# Patient Record
Sex: FEMALE | Race: WHITE | NOT HISPANIC OR LATINO | Employment: UNEMPLOYED | URBAN - METROPOLITAN AREA
[De-identification: names, ages, dates, MRNs, and addresses within clinical notes are randomized per-mention and may not be internally consistent; named-entity substitution may affect disease eponyms.]

---

## 2023-12-12 ENCOUNTER — OFFICE VISIT (OUTPATIENT)
Dept: PRIMARY CARE | Facility: CLINIC | Age: 38
End: 2023-12-12
Payer: COMMERCIAL

## 2023-12-12 VITALS
HEIGHT: 68 IN | HEART RATE: 96 BPM | TEMPERATURE: 97.6 F | WEIGHT: 166 LBS | BODY MASS INDEX: 25.16 KG/M2 | DIASTOLIC BLOOD PRESSURE: 87 MMHG | SYSTOLIC BLOOD PRESSURE: 129 MMHG | OXYGEN SATURATION: 98 %

## 2023-12-12 DIAGNOSIS — G43.E09 CHRONIC MIGRAINE WITH AURA WITHOUT STATUS MIGRAINOSUS, NOT INTRACTABLE: ICD-10-CM

## 2023-12-12 DIAGNOSIS — L85.3 DRY SKIN DERMATITIS: ICD-10-CM

## 2023-12-12 DIAGNOSIS — Z76.89 ENCOUNTER TO ESTABLISH CARE: Primary | ICD-10-CM
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
## 2023-12-12 DIAGNOSIS — S30.861A TICK BITE OF ABDOMINAL WALL, INITIAL ENCOUNTER: ICD-10-CM

## 2023-12-12 PROBLEM — R30.0 DYSURIA: Status: ACTIVE | Noted: 2023-12-12

## 2023-12-12 PROBLEM — R87.610 ASCUS OF CERVIX WITH NEGATIVE HIGH RISK HPV: Status: ACTIVE | Noted: 2023-12-12

## 2023-12-12 PROBLEM — G54.0 THORACIC OUTLET SYNDROME: Status: ACTIVE | Noted: 2023-12-12

## 2023-12-12 PROBLEM — R82.81 PYURIA: Status: ACTIVE | Noted: 2023-12-12

## 2023-12-12 PROBLEM — O09.512 AMA (ADVANCED MATERNAL AGE) PRIMIGRAVIDA 35+, SECOND TRIMESTER (HHS-HCC): Status: ACTIVE | Noted: 2022-03-28

## 2023-12-12 PROBLEM — M99.01 CERVICAL SOMATIC DYSFUNCTION: Status: ACTIVE | Noted: 2023-12-12

## 2023-12-12 PROBLEM — R31.29 OTHER MICROSCOPIC HEMATURIA: Status: ACTIVE | Noted: 2023-12-12

## 2023-12-12 PROCEDURE — 99214 OFFICE O/P EST MOD 30 MIN: CPT | Performed by: STUDENT IN AN ORGANIZED HEALTH CARE EDUCATION/TRAINING PROGRAM

## 2023-12-12 PROCEDURE — 1036F TOBACCO NON-USER: CPT | Performed by: STUDENT IN AN ORGANIZED HEALTH CARE EDUCATION/TRAINING PROGRAM

## 2023-12-12 RX ORDER — SUMATRIPTAN 50 MG/1
50 TABLET, FILM COATED ORAL ONCE AS NEEDED
Qty: 27 TABLET | Refills: 0 | Status: SHIPPED | OUTPATIENT
Start: 2023-12-12 | End: 2024-12-11

## 2023-12-12 RX ORDER — DOXYCYCLINE 100 MG/1
100 CAPSULE ORAL 2 TIMES DAILY
Qty: 6 CAPSULE | Refills: 0 | Status: SHIPPED | OUTPATIENT
Start: 2023-12-12 | End: 2023-12-15

## 2023-12-12 RX ORDER — SUMATRIPTAN 50 MG/1
50 TABLET, FILM COATED ORAL ONCE AS NEEDED
Qty: 27 TABLET | Refills: 0 | Status: SHIPPED | OUTPATIENT
Start: 2023-12-12 | End: 2023-12-12 | Stop reason: SDUPTHER

## 2023-12-12 ASSESSMENT — ENCOUNTER SYMPTOMS
DIARRHEA: 0
RHINORRHEA: 0
COUGH: 0
VOMITING: 0
FATIGUE: 0
FEVER: 0
DEPRESSION: 0
CONSTIPATION: 0
OCCASIONAL FEELINGS OF UNSTEADINESS: 0
NAUSEA: 0
SHORTNESS OF BREATH: 0
ABDOMINAL PAIN: 0
HEADACHES: 1
CHILLS: 0
LOSS OF SENSATION IN FEET: 0

## 2023-12-12 ASSESSMENT — PATIENT HEALTH QUESTIONNAIRE - PHQ9
10. IF YOU CHECKED OFF ANY PROBLEMS, HOW DIFFICULT HAVE THESE PROBLEMS MADE IT FOR YOU TO DO YOUR WORK, TAKE CARE OF THINGS AT HOME, OR GET ALONG WITH OTHER PEOPLE: NOT DIFFICULT AT ALL
SUM OF ALL RESPONSES TO PHQ9 QUESTIONS 1 AND 2: 0
2. FEELING DOWN, DEPRESSED OR HOPELESS: NOT AT ALL
1. LITTLE INTEREST OR PLEASURE IN DOING THINGS: NOT AT ALL

## 2023-12-12 ASSESSMENT — COLUMBIA-SUICIDE SEVERITY RATING SCALE - C-SSRS
2. HAVE YOU ACTUALLY HAD ANY THOUGHTS OF KILLING YOURSELF?: NO
1. IN THE PAST MONTH, HAVE YOU WISHED YOU WERE DEAD OR WISHED YOU COULD GO TO SLEEP AND NOT WAKE UP?: NO
6. HAVE YOU EVER DONE ANYTHING, STARTED TO DO ANYTHING, OR PREPARED TO DO ANYTHING TO END YOUR LIFE?: NO

## 2023-12-12 NOTE — PROGRESS NOTES
"Subjective   Patient ID: Sherin Paniagua is a 38 y.o. female who presents for Establish Care (Tick bite 2 days ago ; migraines ; psoriasis).    HPI     She was previously following with a physician in the office.  She has been following with her Ob/Gyn for her pregnancies.    She had a tick bite. She noticed the tick on 12/9/23 and attached 12/11/23. No current symptoms.  She did not notice it initially.    She has been getting more of her migraines in the past.  She does get auras visually with blinking lights.  She has not     She has a history of psoriasis on her scalp.  She follows with the dermatologist.  She has a medication for a steroid.    Review of Systems   Constitutional:  Negative for chills, fatigue and fever.   HENT:  Negative for congestion and rhinorrhea.    Respiratory:  Negative for cough and shortness of breath.    Cardiovascular:  Negative for chest pain.   Gastrointestinal:  Negative for abdominal pain, constipation, diarrhea, nausea and vomiting.   Skin:  Negative for rash.        Dry skin over bilateral knees   Neurological:  Positive for headaches (intermittent).       Objective   /87   Pulse 96   Temp 36.4 °C (97.6 °F)   Ht 1.727 m (5' 8\")   Wt 75.3 kg (166 lb)   LMP 11/17/2023 (Exact Date)   SpO2 98%   BMI 25.24 kg/m²     Physical Exam  Vitals and nursing note reviewed.   Constitutional:       General: She is not in acute distress.     Appearance: Normal appearance. She is normal weight. She is not ill-appearing or toxic-appearing.   Cardiovascular:      Rate and Rhythm: Normal rate and regular rhythm.      Heart sounds: Normal heart sounds.   Pulmonary:      Effort: Pulmonary effort is normal.      Breath sounds: Normal breath sounds.   Skin:     General: Skin is warm and dry.      Comments: Tick bite left abdomen.    Dry skin both knees   Neurological:      Mental Status: She is alert.         Assessment/Plan   Problem List Items Addressed This Visit             ICD-10-CM    " Chronic migraine with aura without status migrainosus, not intractable G43.E09    Relevant Medications    SUMAtriptan (Imitrex) 50 mg tablet     Other Visit Diagnoses         Codes    Encounter to establish care    -  Primary Z76.89    Relevant Orders    CBC and Auto Differential    Comprehensive Metabolic Panel    Lipid Panel    Vitamin D 25-Hydroxy,Total (for eval of Vitamin D levels)    Urinalysis with Reflex Microscopic    TSH with reflex to Free T4 if abnormal    Tick bite of abdominal wall, initial encounter     S30.861A, W57.XXXA    Relevant Medications    doxycycline (Vibramycin) 100 mg capsule    Dry skin dermatitis     L85.3          History and physical examination as above.  Patient started on doxycycline for the next couple of days due to the tick bite.  Patient will call if she starts getting any symptoms especially a rash.  Refilled patient's medication for sumatriptan for her migraines.   Discussed patient's dry skin and recommended different moisturizers to starting with.  Blood work order sent to the patient to have done fasting.  Plan for patient to follow-up sometime in the next month for a wellness exam.  Patient will come in sooner for acute concerns or complaints.

## 2023-12-12 NOTE — PATIENT INSTRUCTIONS
Thank you for coming in and getting an established appointment with me.    I went ahead and sent in a medication called doxycycline that you will take twice a day for the next 3 days for the tick bite.  Please call if you start having any other concerns or symptoms.  Initial symptoms of Lyme in many cases is a rash with a typical bull's-eye-like appearance but not necessarily.  This can be over the area of the tick bite or at another area on the body.      I went ahead and sent in a prescription to trial for sumatriptan for your migraines.  Please take as directed only as needed for the migraine headaches.    Please start with moisturizing lotion for the very dry skin on the knees.  This does not look like it is typical psoriasis.  You can start with a good moisturizing lotion as we discussed in the office.    I did order lab work for you.  Please get the labs done before the end of the year.  Please be fasting for about 10 hours before getting it performed.  Lab is located downstairs on the first floor.  Lab is open on weekdays from about 6:30 AM to about 4:30 PM.  We will plan for a follow-up for a wellness visit sometime in January.  You can get set up for an appointment before you leave here today.    Please call with any additional questions or concerns.    Thank you

## 2023-12-20 ENCOUNTER — LAB (OUTPATIENT)
Dept: LAB | Facility: LAB | Age: 38
End: 2023-12-20
Payer: COMMERCIAL

## 2023-12-20 DIAGNOSIS — Z76.89 ENCOUNTER TO ESTABLISH CARE: Primary | ICD-10-CM

## 2023-12-20 DIAGNOSIS — R79.89 ABNORMAL TSH: ICD-10-CM

## 2023-12-20 LAB
BASOPHILS # BLD AUTO: 0.04 X10*3/UL (ref 0–0.1)
BASOPHILS NFR BLD AUTO: 0.7 %
BURR CELLS BLD QL SMEAR: NORMAL
EOSINOPHIL # BLD AUTO: 0.05 X10*3/UL (ref 0–0.7)
EOSINOPHIL NFR BLD AUTO: 0.9 %
ERYTHROCYTE [DISTWIDTH] IN BLOOD BY AUTOMATED COUNT: 13.5 % (ref 11.5–14.5)
HCT VFR BLD AUTO: 41.3 % (ref 36–46)
HGB BLD-MCNC: 13.4 G/DL (ref 12–16)
IMM GRANULOCYTES # BLD AUTO: 0.01 X10*3/UL (ref 0–0.7)
IMM GRANULOCYTES NFR BLD AUTO: 0.2 % (ref 0–0.9)
LYMPHOCYTES # BLD AUTO: 2 X10*3/UL (ref 1.2–4.8)
LYMPHOCYTES NFR BLD AUTO: 34.8 %
MCH RBC QN AUTO: 27.6 PG (ref 26–34)
MCHC RBC AUTO-ENTMCNC: 32.4 G/DL (ref 32–36)
MCV RBC AUTO: 85 FL (ref 80–100)
MONOCYTES # BLD AUTO: 0.27 X10*3/UL (ref 0.1–1)
MONOCYTES NFR BLD AUTO: 4.7 %
NEUTROPHILS # BLD AUTO: 3.37 X10*3/UL (ref 1.2–7.7)
NEUTROPHILS NFR BLD AUTO: 58.7 %
NRBC BLD-RTO: 0 /100 WBCS (ref 0–0)
PLATELET # BLD AUTO: NORMAL 10*3/UL
RBC # BLD AUTO: 4.85 X10*6/UL (ref 4–5.2)
RBC MORPH BLD: NORMAL
WBC # BLD AUTO: 5.7 X10*3/UL (ref 4.4–11.3)

## 2023-12-20 PROCEDURE — 80061 LIPID PANEL: CPT

## 2023-12-20 PROCEDURE — 84439 ASSAY OF FREE THYROXINE: CPT

## 2023-12-20 PROCEDURE — 85025 COMPLETE CBC W/AUTO DIFF WBC: CPT

## 2023-12-20 PROCEDURE — 36415 COLL VENOUS BLD VENIPUNCTURE: CPT

## 2023-12-20 PROCEDURE — 82306 VITAMIN D 25 HYDROXY: CPT

## 2023-12-20 PROCEDURE — 81001 URINALYSIS AUTO W/SCOPE: CPT

## 2023-12-20 PROCEDURE — 80053 COMPREHEN METABOLIC PANEL: CPT

## 2023-12-20 PROCEDURE — 84443 ASSAY THYROID STIM HORMONE: CPT

## 2023-12-21 LAB
25(OH)D3 SERPL-MCNC: 18 NG/ML (ref 30–100)
ALBUMIN SERPL BCP-MCNC: 4.5 G/DL (ref 3.4–5)
ALP SERPL-CCNC: 40 U/L (ref 33–110)
ALT SERPL W P-5'-P-CCNC: 12 U/L (ref 7–45)
ANION GAP SERPL CALC-SCNC: 15 MMOL/L (ref 10–20)
APPEARANCE UR: ABNORMAL
AST SERPL W P-5'-P-CCNC: 16 U/L (ref 9–39)
BILIRUB SERPL-MCNC: 0.6 MG/DL (ref 0–1.2)
BILIRUB UR STRIP.AUTO-MCNC: NEGATIVE MG/DL
BUN SERPL-MCNC: 17 MG/DL (ref 6–23)
CALCIUM SERPL-MCNC: 9.4 MG/DL (ref 8.6–10.6)
CHLORIDE SERPL-SCNC: 107 MMOL/L (ref 98–107)
CHOLEST SERPL-MCNC: 191 MG/DL (ref 0–199)
CHOLESTEROL/HDL RATIO: 3.4
CO2 SERPL-SCNC: 24 MMOL/L (ref 21–32)
COLOR UR: ABNORMAL
CREAT SERPL-MCNC: 0.69 MG/DL (ref 0.5–1.05)
GFR SERPL CREATININE-BSD FRML MDRD: >90 ML/MIN/1.73M*2
GLUCOSE SERPL-MCNC: 87 MG/DL (ref 74–99)
GLUCOSE UR STRIP.AUTO-MCNC: NEGATIVE MG/DL
HDLC SERPL-MCNC: 56.7 MG/DL
KETONES UR STRIP.AUTO-MCNC: NEGATIVE MG/DL
LDLC SERPL CALC-MCNC: 110 MG/DL
LEUKOCYTE ESTERASE UR QL STRIP.AUTO: ABNORMAL
MUCOUS THREADS #/AREA URNS AUTO: ABNORMAL /LPF
NITRITE UR QL STRIP.AUTO: NEGATIVE
NON HDL CHOLESTEROL: 134 MG/DL (ref 0–149)
PH UR STRIP.AUTO: 5 [PH]
POTASSIUM SERPL-SCNC: 4.5 MMOL/L (ref 3.5–5.3)
PROT SERPL-MCNC: 7.6 G/DL (ref 6.4–8.2)
PROT UR STRIP.AUTO-MCNC: NEGATIVE MG/DL
RBC # UR STRIP.AUTO: ABNORMAL /UL
RBC #/AREA URNS AUTO: ABNORMAL /HPF
SODIUM SERPL-SCNC: 141 MMOL/L (ref 136–145)
SP GR UR STRIP.AUTO: 1.02
SQUAMOUS #/AREA URNS AUTO: ABNORMAL /HPF
T4 FREE SERPL-MCNC: 1.2 NG/DL (ref 0.78–1.48)
TRIGL SERPL-MCNC: 124 MG/DL (ref 0–149)
TSH SERPL-ACNC: 0.21 MIU/L (ref 0.44–3.98)
UROBILINOGEN UR STRIP.AUTO-MCNC: <2 MG/DL
VLDL: 25 MG/DL (ref 0–40)
WBC #/AREA URNS AUTO: ABNORMAL /HPF

## 2024-01-22 ENCOUNTER — LAB (OUTPATIENT)
Dept: LAB | Facility: LAB | Age: 39
End: 2024-01-22
Payer: COMMERCIAL

## 2024-01-22 DIAGNOSIS — R79.89 ABNORMAL TSH: ICD-10-CM

## 2024-01-22 LAB
T4 FREE SERPL-MCNC: 1.24 NG/DL (ref 0.78–1.48)
TSH SERPL-ACNC: 0.38 MIU/L (ref 0.44–3.98)

## 2024-01-22 PROCEDURE — 84443 ASSAY THYROID STIM HORMONE: CPT

## 2024-01-22 PROCEDURE — 36415 COLL VENOUS BLD VENIPUNCTURE: CPT

## 2024-01-22 PROCEDURE — 84439 ASSAY OF FREE THYROXINE: CPT

## 2024-01-23 ENCOUNTER — OFFICE VISIT (OUTPATIENT)
Dept: PRIMARY CARE | Facility: CLINIC | Age: 39
End: 2024-01-23
Payer: COMMERCIAL

## 2024-01-23 VITALS
HEART RATE: 84 BPM | HEIGHT: 68 IN | OXYGEN SATURATION: 97 % | DIASTOLIC BLOOD PRESSURE: 75 MMHG | BODY MASS INDEX: 25.01 KG/M2 | WEIGHT: 165 LBS | SYSTOLIC BLOOD PRESSURE: 105 MMHG | TEMPERATURE: 97.7 F

## 2024-01-23 DIAGNOSIS — Z00.00 ENCOUNTER FOR WELLNESS EXAMINATION IN ADULT: Primary | ICD-10-CM

## 2024-01-23 DIAGNOSIS — R87.610 ASCUS OF CERVIX WITH NEGATIVE HIGH RISK HPV: ICD-10-CM

## 2024-01-23 DIAGNOSIS — E55.9 VITAMIN D DEFICIENCY: ICD-10-CM

## 2024-01-23 DIAGNOSIS — G43.E09 CHRONIC MIGRAINE WITH AURA WITHOUT STATUS MIGRAINOSUS, NOT INTRACTABLE: ICD-10-CM

## 2024-01-23 PROCEDURE — 1036F TOBACCO NON-USER: CPT | Performed by: STUDENT IN AN ORGANIZED HEALTH CARE EDUCATION/TRAINING PROGRAM

## 2024-01-23 PROCEDURE — 99395 PREV VISIT EST AGE 18-39: CPT | Performed by: STUDENT IN AN ORGANIZED HEALTH CARE EDUCATION/TRAINING PROGRAM

## 2024-01-23 RX ORDER — ACETAMINOPHEN 500 MG
TABLET ORAL DAILY
COMMUNITY

## 2024-01-23 RX ORDER — ERGOCALCIFEROL 1.25 MG/1
50000 CAPSULE ORAL
Qty: 4 CAPSULE | Refills: 1 | Status: SHIPPED | OUTPATIENT
Start: 2024-01-23 | End: 2024-03-19

## 2024-01-23 ASSESSMENT — ENCOUNTER SYMPTOMS
PALPITATIONS: 0
DYSURIA: 0
NERVOUS/ANXIOUS: 0
NUMBNESS: 0
DYSPHORIC MOOD: 0
RHINORRHEA: 0
ABDOMINAL PAIN: 0
FEVER: 0
FATIGUE: 0
VOMITING: 0
FREQUENCY: 0
MYALGIAS: 0
DIARRHEA: 0
COLOR CHANGE: 0
ARTHRALGIAS: 0
SHORTNESS OF BREATH: 0
CHILLS: 0
NAUSEA: 0
DIZZINESS: 0
CONSTIPATION: 0
LIGHT-HEADEDNESS: 0
COUGH: 0

## 2024-01-23 ASSESSMENT — PATIENT HEALTH QUESTIONNAIRE - PHQ9
SUM OF ALL RESPONSES TO PHQ9 QUESTIONS 1 AND 2: 0
1. LITTLE INTEREST OR PLEASURE IN DOING THINGS: NOT AT ALL
2. FEELING DOWN, DEPRESSED OR HOPELESS: NOT AT ALL

## 2024-01-23 NOTE — PATIENT INSTRUCTIONS
Thank you for coming for your wellness examination today.    I went ahead and sent in a prescription for vitamin D.  You can take this once a week for the next 8 weeks.  I did order repeat vitamin D level to be checked after that time and we will contact you with those results.    We reviewed the results of your lab work which overall look pretty good.  We discussed healthy diet and exercise and dietary changes to help with some elevated cholesterol.  We can definitely check this again in 6 months or after if you would like.  Just call if you would need an order for the blood test.  Continue with plenty of fruits and vegetables, lean sources of protein, watching her carbohydrate intake and not drinking extra calories.  I do recommend trying to add an exercise to your routine.  This can start very light at first especially given your time commitment with children.  I do always recommend some combination of cardiovascular activity, stretching and some sort of strength training.  Always start off very light and then slowly increase as you are able to.  I always recommend starting off with basic as well.    Excellent job with keeping away from tobacco and limiting alcohol use.  Please continue with regular OB/GYN follow-up.    We will continue with yearly follow-up.  Please come in sooner for any other acute concerns or complaints.    Thank you

## 2024-01-23 NOTE — PROGRESS NOTES
"Subjective   Patient ID: Sherin Paniagua is a 38 y.o. female who presents for Annual Exam.    HPI     No acute concerns or complaints today.    Dental: Regular dental exams twice yearly.  Vision: Glasses and contacts. Regular yearly eye exams.    Diet: Vegetarian diet 4 days a week. Still lean protein.  Exercise: With her kids. On her feet and walking all day.  Caffeine: 1 cup of coffee  Fluids: Not very well hydrated.  Supplements: Vitamin D, Prenatal    Tobacco: None. Never.  Alcohol: Socially. 1 drink per week max.  Recreational Drugs: None.    Last Colonoscopy: No family history of colon cancer.  Last Pap smear: Regular Ob/Gyn care. Pap updated 01/2024.  Mammogram: No family history of breast cancer.  Low Dose Lung CT Screening: Not indicated  AAA Screening: Not indicated.    Influenza: Recommended booster.  Tetanus: 2020  COVID: Pfizer last updated 2022. Recommended 2501-0087 booster.    Review of Systems   Constitutional:  Negative for chills, fatigue and fever.   HENT:  Negative for congestion and rhinorrhea.    Eyes:  Negative for visual disturbance.   Respiratory:  Negative for cough and shortness of breath.    Cardiovascular:  Negative for chest pain and palpitations.   Gastrointestinal:  Negative for abdominal pain, constipation, diarrhea, nausea and vomiting.   Genitourinary:  Negative for dysuria and frequency.   Musculoskeletal:  Negative for arthralgias and myalgias.   Skin:  Negative for color change and rash.   Neurological:  Negative for dizziness, light-headedness and numbness.   Psychiatric/Behavioral:  Negative for dysphoric mood. The patient is not nervous/anxious.        Objective   /75   Pulse 84   Temp 36.5 °C (97.7 °F)   Ht 1.715 m (5' 7.5\")   Wt 74.8 kg (165 lb)   SpO2 97%   BMI 25.46 kg/m²   BSA Body surface area is 1.89 meters squared.      Physical Exam  Vitals and nursing note reviewed.   Constitutional:       General: She is not in acute distress.     Appearance: Normal " appearance. She is normal weight. She is not ill-appearing or toxic-appearing.   HENT:      Head: Normocephalic and atraumatic.      Right Ear: Tympanic membrane, ear canal and external ear normal.      Left Ear: Tympanic membrane, ear canal and external ear normal.      Nose: Nose normal.      Mouth/Throat:      Mouth: Mucous membranes are moist.   Eyes:      Extraocular Movements: Extraocular movements intact.      Conjunctiva/sclera: Conjunctivae normal.      Pupils: Pupils are equal, round, and reactive to light.   Cardiovascular:      Rate and Rhythm: Normal rate and regular rhythm.      Pulses: Normal pulses.      Heart sounds: Normal heart sounds.   Pulmonary:      Effort: Pulmonary effort is normal.      Breath sounds: Normal breath sounds.   Abdominal:      General: Abdomen is flat. Bowel sounds are normal.      Palpations: Abdomen is soft.   Musculoskeletal:         General: Normal range of motion.      Cervical back: Normal range of motion and neck supple.   Skin:     General: Skin is warm.   Neurological:      General: No focal deficit present.      Mental Status: She is alert and oriented to person, place, and time. Mental status is at baseline.      Cranial Nerves: No cranial nerve deficit.      Sensory: No sensory deficit.   Psychiatric:         Mood and Affect: Mood normal.         Behavior: Behavior normal.         Thought Content: Thought content normal.         Judgment: Judgment normal.       Lab on 01/22/2024   Component Date Value Ref Range Status    Thyroid Stimulating Hormone 01/22/2024 0.38 (L)  0.44 - 3.98 mIU/L Final    Thyroxine, Free 01/22/2024 1.24  0.78 - 1.48 ng/dL Final   Lab on 12/20/2023   Component Date Value Ref Range Status    WBC 12/20/2023 5.7  4.4 - 11.3 x10*3/uL Final    nRBC 12/20/2023 0.0  0.0 - 0.0 /100 WBCs Final    RBC 12/20/2023 4.85  4.00 - 5.20 x10*6/uL Final    Hemoglobin 12/20/2023 13.4  12.0 - 16.0 g/dL Final    Hematocrit 12/20/2023 41.3  36.0 - 46.0 % Final     MCV 12/20/2023 85  80 - 100 fL Final    MCH 12/20/2023 27.6  26.0 - 34.0 pg Final    MCHC 12/20/2023 32.4  32.0 - 36.0 g/dL Final    RDW 12/20/2023 13.5  11.5 - 14.5 % Final    Platelets 12/20/2023    Final    PLATELET CLUMPS PRECLUDE QUANTITATION. PLATELET ESTIMATE APPEARS ADEQUATE    Neutrophils % 12/20/2023 58.7  40.0 - 80.0 % Final    Immature Granulocytes %, Automated 12/20/2023 0.2  0.0 - 0.9 % Final    Immature Granulocyte Count (IG) includes promyelocytes, myelocytes and metamyelocytes but does not include bands. Percent differential counts (%) should be interpreted in the context of the absolute cell counts (cells/UL).    Lymphocytes % 12/20/2023 34.8  13.0 - 44.0 % Final    Monocytes % 12/20/2023 4.7  2.0 - 10.0 % Final    Eosinophils % 12/20/2023 0.9  0.0 - 6.0 % Final    Basophils % 12/20/2023 0.7  0.0 - 2.0 % Final    Neutrophils Absolute 12/20/2023 3.37  1.20 - 7.70 x10*3/uL Final    Percent differential counts (%) should be interpreted in the context of the absolute cell counts (cells/uL).    Immature Granulocytes Absolute, Au* 12/20/2023 0.01  0.00 - 0.70 x10*3/uL Final    Lymphocytes Absolute 12/20/2023 2.00  1.20 - 4.80 x10*3/uL Final    Monocytes Absolute 12/20/2023 0.27  0.10 - 1.00 x10*3/uL Final    Eosinophils Absolute 12/20/2023 0.05  0.00 - 0.70 x10*3/uL Final    Basophils Absolute 12/20/2023 0.04  0.00 - 0.10 x10*3/uL Final    Glucose 12/20/2023 87  74 - 99 mg/dL Final    Sodium 12/20/2023 141  136 - 145 mmol/L Final    Potassium 12/20/2023 4.5  3.5 - 5.3 mmol/L Final    Chloride 12/20/2023 107  98 - 107 mmol/L Final    Bicarbonate 12/20/2023 24  21 - 32 mmol/L Final    Anion Gap 12/20/2023 15  10 - 20 mmol/L Final    Urea Nitrogen 12/20/2023 17  6 - 23 mg/dL Final    Creatinine 12/20/2023 0.69  0.50 - 1.05 mg/dL Final    eGFR 12/20/2023 >90  >60 mL/min/1.73m*2 Final    Calculations of estimated GFR are performed using the 2021 CKD-EPI Study Refit equation without the race variable for the  IDMS-Traceable creatinine methods.  https://jasn.asnjournals.org/content/early/2021/09/22/ASN.3845742543    Calcium 12/20/2023 9.4  8.6 - 10.6 mg/dL Final    Albumin 12/20/2023 4.5  3.4 - 5.0 g/dL Final    Alkaline Phosphatase 12/20/2023 40  33 - 110 U/L Final    Total Protein 12/20/2023 7.6  6.4 - 8.2 g/dL Final    AST 12/20/2023 16  9 - 39 U/L Final    Bilirubin, Total 12/20/2023 0.6  0.0 - 1.2 mg/dL Final    ALT 12/20/2023 12  7 - 45 U/L Final    Patients treated with Sulfasalazine may generate falsely decreased results for ALT.    Cholesterol 12/20/2023 191  0 - 199 mg/dL Final          Age      Desirable   Borderline High   High     0-19 Y     0 - 169       170 - 199     >/= 200    20-24 Y     0 - 189       190 - 224     >/= 225         >24 Y     0 - 199       200 - 239     >/= 240   **All ranges are based on fasting samples. Specific   therapeutic targets will vary based on patient-specific   cardiac risk.    Pediatric guidelines reference:Pediatrics 2011, 128(S5).Adult guidelines reference: NCEP ATPIII Guidelines,RENATO 2001, 258:2486-97    Venipuncture immediately after or during the administration of Metamizole may lead to falsely low results. Testing should be performed immediately prior to Metamizole dosing.    HDL-Cholesterol 12/20/2023 56.7  mg/dL Final      Age       Very Low   Low     Normal    High    0-19 Y    < 35      < 40     40-45     ----  20-24 Y    ----     < 40      >45      ----        >24 Y      ----     < 40     40-60      >60      Cholesterol/HDL Ratio 12/20/2023 3.4   Final      Ref Values  Desirable  < 3.4  High Risk  > 5.0    LDL Calculated 12/20/2023 110 (H)  <=99 mg/dL Final                                Near   Borderline      AGE      Desirable  Optimal    High     High     Very High     0-19 Y     0 - 109     ---    110-129   >/= 130     ----    20-24 Y     0 - 119     ---    120-159   >/= 160     ----      >24 Y     0 -  99   100-129  130-159   160-189     >/=190      VLDL  12/20/2023 25  0 - 40 mg/dL Final    Triglycerides 12/20/2023 124  0 - 149 mg/dL Final       Age         Desirable   Borderline High   High     Very High   0 D-90 D    19 - 174         ----         ----        ----  91 D- 9 Y     0 -  74        75 -  99     >/= 100      ----    10-19 Y     0 -  89        90 - 129     >/= 130      ----    20-24 Y     0 - 114       115 - 149     >/= 150      ----         >24 Y     0 - 149       150 - 199    200- 499    >/= 500    Venipuncture immediately after or during the administration of Metamizole may lead to falsely low results. Testing should be performed immediately prior to Metamizole dosing.    Non HDL Cholesterol 12/20/2023 134  0 - 149 mg/dL Final          Age       Desirable   Borderline High   High     Very High     0-19 Y     0 - 119       120 - 144     >/= 145    >/= 160    20-24 Y     0 - 149       150 - 189     >/= 190      ----         >24 Y    30 mg/dL above LDL Cholesterol goal      Vitamin D, 25-Hydroxy, Total 12/20/2023 18 (L)  30 - 100 ng/mL Final    Color, Urine 12/20/2023 Maria Luz (N)  Straw, Yellow Final    Appearance, Urine 12/20/2023 Hazy (N)  Clear Final    Specific Gravity, Urine 12/20/2023 1.023  1.005 - 1.035 Final    pH, Urine 12/20/2023 5.0  5.0, 5.5, 6.0, 6.5, 7.0, 7.5, 8.0 Final    Protein, Urine 12/20/2023 NEGATIVE  NEGATIVE mg/dL Final    Glucose, Urine 12/20/2023 NEGATIVE  NEGATIVE mg/dL Final    Blood, Urine 12/20/2023 MODERATE (2+) (A)  NEGATIVE Final    Ketones, Urine 12/20/2023 NEGATIVE  NEGATIVE mg/dL Final    Bilirubin, Urine 12/20/2023 NEGATIVE  NEGATIVE Final    Urobilinogen, Urine 12/20/2023 <2.0  <2.0 mg/dL Final    Nitrite, Urine 12/20/2023 NEGATIVE  NEGATIVE Final    Leukocyte Esterase, Urine 12/20/2023 SMALL (1+) (A)  NEGATIVE Final    Thyroid Stimulating Hormone 12/20/2023 0.21 (L)  0.44 - 3.98 mIU/L Final    RBC Morphology 12/20/2023 See Below   Final    Arlene Cells 12/20/2023 Few   Final    WBC, Urine 12/20/2023 11-20 (A)  1-5, NONE  /HPF Final    RBC, Urine 12/20/2023 1-2  NONE, 1-2, 3-5 /HPF Final    Squamous Epithelial Cells, Urine 12/20/2023 10-25 (FEW)  Reference range not established. /HPF Final    Mucus, Urine 12/20/2023 1+  Reference range not established. /LPF Final    Thyroxine, Free 12/20/2023 1.20  0.78 - 1.48 ng/dL Final     Current Outpatient Medications on File Prior to Visit   Medication Sig Dispense Refill    cholecalciferol (Vitamin D3) 5,000 Units tablet Take by mouth once daily.      SUMAtriptan (Imitrex) 50 mg tablet Take 1 tablet (50 mg) by mouth 1 time if needed for migraine. May repeat after 2 hours. 27 tablet 0     No current facility-administered medications on file prior to visit.     No images are attached to the encounter.        Assessment/Plan   Problem List Items Addressed This Visit             ICD-10-CM    ASCUS of cervix with negative high risk HPV R87.610     History. Following with Ob/Gyn for regular Paps yearly.         Chronic migraine with aura without status migrainosus, not intractable G43.E09     Chronic. Sumatriptan as needed.         Vitamin D deficiency E55.9    Relevant Medications    ergocalciferol (Vitamin D-2) 1.25 MG (22056 UT) capsule    Other Relevant Orders    Vitamin D 25-Hydroxy,Total (for eval of Vitamin D levels)     Other Visit Diagnoses         Codes    Encounter for wellness examination in adult    -  Primary Z00.00          History and physical examination as above.  Patient presenting for annual wellness visit.  No acute concerns or complaints.  Discussed multiple aspects of health including healthy diet and exercise, regular dental and vision care, caffeine, fluid hydration and supplementations, substance use, screening tests and immunizations.  Patient follows regularly with her OB/GYN.  Started on vitamin D supplementation.  Patient can consider repeat testing with cholesterol in about 6 months after making dietary and lifestyle changes.  Patient will continue with yearly wellness  care.  She can come in sooner for other acute concerns or complaints.

## 2025-01-28 ENCOUNTER — APPOINTMENT (OUTPATIENT)
Dept: PRIMARY CARE | Facility: CLINIC | Age: 40
End: 2025-01-28
Payer: COMMERCIAL

## 2025-01-28 VITALS
HEIGHT: 68 IN | DIASTOLIC BLOOD PRESSURE: 82 MMHG | SYSTOLIC BLOOD PRESSURE: 116 MMHG | HEART RATE: 73 BPM | OXYGEN SATURATION: 97 % | WEIGHT: 184 LBS | BODY MASS INDEX: 27.89 KG/M2 | TEMPERATURE: 97 F

## 2025-01-28 DIAGNOSIS — E55.9 VITAMIN D DEFICIENCY: ICD-10-CM

## 2025-01-28 DIAGNOSIS — Z00.00 HEALTHCARE MAINTENANCE: ICD-10-CM

## 2025-01-28 DIAGNOSIS — Z00.00 ENCOUNTER FOR ADULT WELLNESS VISIT: Primary | ICD-10-CM

## 2025-01-28 PROCEDURE — 3008F BODY MASS INDEX DOCD: CPT | Performed by: STUDENT IN AN ORGANIZED HEALTH CARE EDUCATION/TRAINING PROGRAM

## 2025-01-28 PROCEDURE — 99395 PREV VISIT EST AGE 18-39: CPT | Performed by: STUDENT IN AN ORGANIZED HEALTH CARE EDUCATION/TRAINING PROGRAM

## 2025-01-28 ASSESSMENT — ENCOUNTER SYMPTOMS
COLOR CHANGE: 0
DIARRHEA: 0
CHILLS: 0
DYSPHORIC MOOD: 0
ARTHRALGIAS: 0
FATIGUE: 0
CONSTIPATION: 0
MYALGIAS: 0
COUGH: 0
NUMBNESS: 0
RHINORRHEA: 0
VOMITING: 0
FREQUENCY: 0
FEVER: 0
NAUSEA: 0
ABDOMINAL PAIN: 0
NERVOUS/ANXIOUS: 0
SHORTNESS OF BREATH: 0
DIZZINESS: 0
LIGHT-HEADEDNESS: 0
DYSURIA: 0
PALPITATIONS: 0

## 2025-01-28 ASSESSMENT — PATIENT HEALTH QUESTIONNAIRE - PHQ9
1. LITTLE INTEREST OR PLEASURE IN DOING THINGS: NOT AT ALL
2. FEELING DOWN, DEPRESSED OR HOPELESS: NOT AT ALL
SUM OF ALL RESPONSES TO PHQ9 QUESTIONS 1 AND 2: 0

## 2025-01-28 NOTE — PROGRESS NOTES
"Subjective   Patient ID: Sherin Paniagua is a 39 y.o. female who presents for Annual Exam.    HPI     No acute concerns or complaints today.    Dental: Regular dental exams twice yearly.  Vision: Glasses and contacts. Regular yearly eye exams.    Diet: Breast feeding and so she is thirsty. Not as good of a diet currently with her kids.   Exercise: With her kids. On her feet and walking all day.   Caffeine: 1 cup of coffee  Fluids: Working at hydration  Supplements: None currently    Tobacco: None. Never.  Alcohol: None while breastfeeding. Otherwise socially. 1 drink per week max.  Recreational Drugs: None.    Last Colonoscopy: No family history of colon cancer.   Last Pap smear: Following with Ob/Gyn and up to date on Paps.  Mammogram: No family history of breast cancer.  Low Dose Lung CT Screening: Not indicated  AAA Screening: Not indicated.    Influenza: Recommended annually.  Tetanus: 2024  COVID: Up to date for 5155-5973  RSV: Complete with pregnancy in 2024    Review of Systems   Constitutional:  Negative for chills, fatigue and fever.   HENT:  Negative for congestion and rhinorrhea.    Eyes:  Negative for visual disturbance.   Respiratory:  Negative for cough and shortness of breath.    Cardiovascular:  Negative for chest pain and palpitations.   Gastrointestinal:  Negative for abdominal pain, constipation, diarrhea, nausea and vomiting.   Genitourinary:  Negative for dysuria and frequency.   Musculoskeletal:  Negative for arthralgias and myalgias.   Skin:  Negative for color change and rash.   Neurological:  Negative for dizziness, light-headedness and numbness.   Psychiatric/Behavioral:  Negative for dysphoric mood. The patient is not nervous/anxious.        Objective   /82   Pulse 73   Temp 36.1 °C (97 °F)   Ht 1.715 m (5' 7.5\")   Wt 83.5 kg (184 lb)   SpO2 97%   BMI 28.39 kg/m²   BSA Body surface area is 1.99 meters squared.      Physical Exam  Vitals and nursing note reviewed. "   Constitutional:       General: She is not in acute distress.     Appearance: Normal appearance. She is normal weight. She is not ill-appearing or toxic-appearing.   HENT:      Head: Normocephalic and atraumatic.      Right Ear: Tympanic membrane, ear canal and external ear normal.      Left Ear: Tympanic membrane, ear canal and external ear normal.      Nose: Nose normal.      Mouth/Throat:      Mouth: Mucous membranes are moist.   Eyes:      Extraocular Movements: Extraocular movements intact.      Conjunctiva/sclera: Conjunctivae normal.      Pupils: Pupils are equal, round, and reactive to light.   Cardiovascular:      Rate and Rhythm: Normal rate and regular rhythm.      Pulses: Normal pulses.      Heart sounds: Normal heart sounds.   Pulmonary:      Effort: Pulmonary effort is normal.      Breath sounds: Normal breath sounds.   Abdominal:      General: Abdomen is flat. Bowel sounds are normal.      Palpations: Abdomen is soft.   Musculoskeletal:         General: Normal range of motion.      Cervical back: Normal range of motion and neck supple.   Skin:     General: Skin is warm.   Neurological:      General: No focal deficit present.      Mental Status: She is alert and oriented to person, place, and time. Mental status is at baseline.      Cranial Nerves: No cranial nerve deficit.      Sensory: No sensory deficit.   Psychiatric:         Mood and Affect: Mood normal.         Behavior: Behavior normal.         Thought Content: Thought content normal.         Judgment: Judgment normal.       No visits with results within 1 Year(s) from this visit.   Latest known visit with results is:   Lab on 01/22/2024   Component Date Value Ref Range Status    Thyroid Stimulating Hormone 01/22/2024 0.38 (L)  0.44 - 3.98 mIU/L Final    Thyroxine, Free 01/22/2024 1.24  0.78 - 1.48 ng/dL Final     Current Outpatient Medications on File Prior to Visit   Medication Sig Dispense Refill    PNV 22/iron,gluc/folic/dss/dha (PNV OB+DHA  ORAL) Take by mouth.      cholecalciferol (Vitamin D3) 5,000 Units tablet Take by mouth once daily. (Patient not taking: Reported on 1/28/2025)      SUMAtriptan (Imitrex) 50 mg tablet Take 1 tablet (50 mg) by mouth 1 time if needed for migraine. May repeat after 2 hours. 27 tablet 0     No current facility-administered medications on file prior to visit.     No images are attached to the encounter.        Assessment/Plan   Problem List Items Addressed This Visit             ICD-10-CM    Vitamin D deficiency E55.9     Other Visit Diagnoses         Codes    Encounter for adult wellness visit    -  Primary Z00.00    Healthcare maintenance     Z00.00    Relevant Orders    CBC and Auto Differential    Comprehensive Metabolic Panel    Lipid Panel    Vitamin D 25-Hydroxy,Total (for eval of Vitamin D levels)    Thyroid Stimulating Hormone    Thyroxine, Free    Urinalysis with Reflex Microscopic          History and physical examination as above.  Patient presenting for overall wellness examination.  No acute concerns or complaints today.  Discussed multiple aspects of health including healthy diet and exercise, regular dental and vision care, caffeine use, hydration, supplementation, substance use, screening test and immunizations.  Recommendations given as documented.  Lab work orders placed for the patient to be done fasting.  We will plan on following up on the results.  She will call with any other acute concerns or complaints.

## 2025-02-17 ENCOUNTER — APPOINTMENT (OUTPATIENT)
Dept: DERMATOLOGY | Facility: CLINIC | Age: 40
End: 2025-02-17
Payer: COMMERCIAL